# Patient Record
(demographics unavailable — no encounter records)

---

## 2025-03-04 NOTE — PROCEDURE
[Flexible Endoscope] : examined with the flexible endoscope [Congested] : congested [Red] : red [FreeTextEntry6] : The following anatomic sites were directly examined in a sequential fashion: The scope was introduced in the nasal passage between the middle and inferior turbinates to exam the inferior portion of the middle meatus and the fontanelle, as well as the maxillary ostia. Next, the scope was passed medically and posteriorly to the middle turbinates to examine the sphenoethmoid recess and the superior turbinate region. Pt would like to proceed with tonsillectomy. Risks, benefits and alternatives were explained to the patient. They included but were not limited to bleeding, infection, persistent symptoms, dehydration, numbness, change in voice, change in swallowing, need for additional surgery, etc.. All questions were answered at this time.urb [Normal] : posterior cricoid area had healthy pink mucosa in the interarytenoid area and the esophageal inlet

## 2025-03-04 NOTE — HISTORY OF PRESENT ILLNESS
[de-identified] : 64-year-old patient presents with an initial evaluation c/o sinus infection. Pt currently on Flonase with mild improvement with congestion  Patient reports she's been having a problem with having salty metallic acidic taste. Has been experiencing this over a month. Pt went to GI and had scope and was treated with reflux meds with mild relief.  Complains of left ear being clogged, left ear pain.  Complains of nasal congestion and on and off running of the nose.   Pressure in sinus. Denies pain.  Satadalberto tonsils were removed 09/2024.  Mentions she has lymphoma.  Also, she has Rituximab in January last treatment was 2/14 No further concerns or complaints.

## 2025-04-02 NOTE — REASON FOR VISIT
[Subsequent Evaluation] : a subsequent evaluation for [FreeTextEntry2] : acid reflux, chronic rhinitis,bad taste in mouth

## 2025-04-02 NOTE — HISTORY OF PRESENT ILLNESS
[FreeTextEntry1] :  Patient presents for follow up for acid reflux, chronic rhinitis, bad taste in mouth. Took azelastine, fluticasone, levocetirizine, montelukast without improvement of taste. C/o of salty taste and cough, altered taste and smell. Reports improvement of nasal congestion. Hx acid reflux, pantoprazole and famotidine, sees GI.

## 2025-04-02 NOTE — PROCEDURE
[Flexible Endoscope] : examined with the flexible endoscope [Congested] : congested [de-identified] : sinusitis  [FreeTextEntry6] : The following anatomic sites were directly examined in a sequential fashion: The scope was introduced in the nasal passage between the middle and inferior turbinates to exam the inferior portion of the middle meatus and the fontanelle, as well as the maxillary ostia. Next, the scope was passed medically and posteriorly to the middle turbinates to examine the sphenoethmoid recess and the superior turbinate region.

## 2025-06-25 NOTE — HISTORY OF PRESENT ILLNESS
[FreeTextEntry1] : Patient presents for follow up for bad taste in mouth, other acute sinusitis. Feels like something is stuck in through, coughing up phlegm. Went to [facility] 2 weeks ago, was given amoxicillin 875 without improvement of symptoms. Having dry nose, using saline nasal spray. Having salty taste in mouth. Pt has had chest Xray that was negative at [facility].

## 2025-06-25 NOTE — PROCEDURE
[Globus] : globus [None] : none [Flexible Endoscope] : examined with the flexible endoscope [Glottis Arytenoid Cartilages Erythema] : bilateral arytenoid ~M erythema

## 2025-06-25 NOTE — REASON FOR VISIT
[Subsequent Evaluation] : a subsequent evaluation for [FreeTextEntry2] : bad taste in mouth, other acute sinusitis

## 2025-07-10 NOTE — REVIEW OF SYSTEMS
[Nasal Congestion] : nasal congestion [Postnasal Drip] : postnasal drip [Dry Mouth] : dry mouth [Sinus Problems] : sinus problems [Cough] : cough [Sputum] : sputum [Dyspnea] : no dyspnea [Seasonal Allergies] : seasonal allergies [GERD] : gerd [Abdominal Pain] : no abdominal pain [Nausea] : no nausea [Vomiting] : no vomiting [Back Pain] : back pain [Negative] : Endocrine [TextBox_104] : SKIN CANCER

## 2025-07-10 NOTE — HISTORY OF PRESENT ILLNESS
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

## 2025-07-10 NOTE — PHYSICAL EXAM
[No Acute Distress] : no acute distress [Normal Oropharynx] : normal oropharynx [Nasal congestion] : nasal congestion [II] : Mallampati Class: II [Normal Appearance] : normal appearance [Normal Rate/Rhythm] : normal rate/rhythm [Normal S1, S2] : normal s1, s2 [No Resp Distress] : no resp distress [Clear to Auscultation Bilaterally] : clear to auscultation bilaterally [No Abnormalities] : no abnormalities [Benign] : benign [Normal Gait] : normal gait [No Clubbing] : no clubbing [No Cyanosis] : no cyanosis [No Edema] : no edema [Normal Color/ Pigmentation] : normal color/ pigmentation [Oriented x3] : oriented x3 [Normal Affect] : normal affect [TextBox_132] : GROSSLY INTACT

## 2025-07-16 NOTE — REASON FOR VISIT
[Subsequent Evaluation] : a subsequent evaluation for [FreeTextEntry2] : bad taste in mouth, other acute sinusitis.

## 2025-07-16 NOTE — HISTORY OF PRESENT ILLNESS
[FreeTextEntry1] : Pt returns today for f/u for bad taste in mouth, other acute sinusitis. Pt states the bad tastes in her mouth has gotten better with medication give. She states she has salty and metabolic lips. She states the saline is helping with her sinusitis. Pt states she smells well. Pt reports no pain. No further complaints or concerns.